# Patient Record
Sex: MALE
[De-identification: names, ages, dates, MRNs, and addresses within clinical notes are randomized per-mention and may not be internally consistent; named-entity substitution may affect disease eponyms.]

---

## 2023-08-04 PROBLEM — Z00.00 ENCOUNTER FOR PREVENTIVE HEALTH EXAMINATION: Status: ACTIVE | Noted: 2023-08-04

## 2023-08-09 ENCOUNTER — APPOINTMENT (OUTPATIENT)
Dept: INFECTIOUS DISEASE | Facility: CLINIC | Age: 58
End: 2023-08-09
Payer: COMMERCIAL

## 2023-08-09 VITALS
OXYGEN SATURATION: 97 % | HEIGHT: 70 IN | WEIGHT: 214 LBS | SYSTOLIC BLOOD PRESSURE: 161 MMHG | TEMPERATURE: 96.3 F | HEART RATE: 109 BPM | DIASTOLIC BLOOD PRESSURE: 109 MMHG | BODY MASS INDEX: 30.64 KG/M2

## 2023-08-09 DIAGNOSIS — B37.0 CANDIDAL STOMATITIS: ICD-10-CM

## 2023-08-09 PROCEDURE — 99204 OFFICE O/P NEW MOD 45 MIN: CPT

## 2023-08-09 RX ORDER — OMEGA-3 FATTY ACIDS/FISH OIL 360-1200MG
CAPSULE ORAL
Refills: 0 | Status: ACTIVE | COMMUNITY

## 2023-08-09 RX ORDER — FLUCONAZOLE 200 MG/1
200 TABLET ORAL
Refills: 0 | Status: ACTIVE | COMMUNITY

## 2023-08-09 RX ORDER — RIBOFLAVIN (VITAMIN B2) 100 MG
100 TABLET ORAL
Refills: 0 | Status: ACTIVE | COMMUNITY

## 2023-08-09 NOTE — HISTORY OF PRESENT ILLNESS
[FreeTextEntry1] : 58 year old male here concerned for thrush/candidemia. On 7/19, he went to urgent care with nausea and vomiting. He was transferred to ED ([facility] at [address]) where he received fluids, Zofran, Reglan. Work up and CT abd/pelvis was unremarkable. He thought he noted thrush in mouth and bought an over the counter antifungal mouthwash. Nausea and vomiting immediately resolved after use. He was seen at [facility] on [address] for evaluation of fungus. He received fluconazole and was referred to GI. He then saw Dr. Jaime ([facility] on [facility]) on 7/27, who sent blood culture and throat swab. Blood culture negative. Per patient oral swab is still pending. Reports feeling significantly better after 4 days of fluconazole, felt like he was 20 years younger. On 8/6, he picked wife up from airport (she had been visiting family past couple months). Airport  was stressful and he felt sick again. He is concerned that he has systemic or underlying fungal infection causing. Has small lesion on chest he is concerned is fungal.

## 2023-08-09 NOTE — PHYSICAL EXAM
[General Appearance - Alert] : alert [Sclera] : the sclera and conjunctiva were normal [Outer Ear] : the ears and nose were normal in appearance [Hearing Threshold Finger Rub Not [place]] : hearing was normal [Examination Of The Oral Cavity] : the lips and gums were normal [Oropharynx] : the oropharynx was normal with no thrush [Neck Appearance] : the appearance of the neck was normal [] : no respiratory distress [Heart Rate And Rhythm] : heart rate was normal and rhythm regular [Edema] : there was no peripheral edema [Abdomen Soft] : soft [No Palpable Adenopathy] : no palpable adenopathy [Motor Exam] : the motor exam was normal [Oriented To Time, Place, And Person] : oriented to person, place, and time [FreeTextEntry1] : appears anxious

## 2023-08-10 ENCOUNTER — TRANSCRIPTION ENCOUNTER (OUTPATIENT)
Age: 58
End: 2023-08-10

## 2023-08-10 LAB
CD16+CD56+ CELLS # BLD: 391 CELLS/UL
CD16+CD56+ CELLS NFR BLD: 14 %
CD19 CELLS NFR BLD: 282 CELLS/UL
CD3 CELLS # BLD: 2039 CELLS/UL
CD3 CELLS NFR BLD: 72 %
CD3+CD4+ CELLS # BLD: 1287 CELLS/UL
CD3+CD4+ CELLS NFR BLD: 46 %
CD3+CD4+ CELLS/CD3+CD8+ CLL SPEC: 1.77 RATIO
CD3+CD8+ CELLS # SPEC: 729 CELLS/UL
CD3+CD8+ CELLS NFR BLD: 26 %
CELLS.CD3-CD19+/CELLS IN BLOOD: 10 %
HIV1+2 AB SPEC QL IA.RAPID: NONREACTIVE

## 2023-08-11 LAB
FUNGITELL QUALITATIVE RESULT: NEGATIVE
FUNGITELL QUANTITATIVE VALUE: <31 PG/ML

## 2023-08-16 ENCOUNTER — NON-APPOINTMENT (OUTPATIENT)
Age: 58
End: 2023-08-16

## 2023-08-16 LAB — BACTERIA BLD CULT: NORMAL
